# Patient Record
Sex: MALE | HISPANIC OR LATINO | Employment: FULL TIME | URBAN - METROPOLITAN AREA
[De-identification: names, ages, dates, MRNs, and addresses within clinical notes are randomized per-mention and may not be internally consistent; named-entity substitution may affect disease eponyms.]

---

## 2022-03-29 ENCOUNTER — TRANSCRIBE ORDERS (OUTPATIENT)
Dept: ADMINISTRATIVE | Facility: HOSPITAL | Age: 32
End: 2022-03-29

## 2022-03-29 DIAGNOSIS — N50.89 SCROTAL MASS: Primary | ICD-10-CM

## 2022-04-21 ENCOUNTER — HOSPITAL ENCOUNTER (OUTPATIENT)
Dept: ULTRASOUND IMAGING | Facility: HOSPITAL | Age: 32
Discharge: HOME | End: 2022-04-21
Admitting: PHYSICIAN ASSISTANT

## 2022-04-21 DIAGNOSIS — N50.89 SCROTAL MASS: ICD-10-CM

## 2022-04-21 PROCEDURE — 76870 US EXAM SCROTUM: CPT

## 2022-05-03 ENCOUNTER — OFFICE VISIT (OUTPATIENT)
Dept: UROLOGY | Facility: CLINIC | Age: 32
End: 2022-05-03

## 2022-05-03 VITALS
OXYGEN SATURATION: 96 % | WEIGHT: 284 LBS | HEART RATE: 106 BPM | DIASTOLIC BLOOD PRESSURE: 78 MMHG | SYSTOLIC BLOOD PRESSURE: 146 MMHG | BODY MASS INDEX: 42.06 KG/M2 | HEIGHT: 69 IN

## 2022-05-03 DIAGNOSIS — N50.811 PAIN IN RIGHT TESTICLE: Primary | ICD-10-CM

## 2022-05-03 PROCEDURE — 99203 OFFICE O/P NEW LOW 30 MIN: CPT | Performed by: STUDENT IN AN ORGANIZED HEALTH CARE EDUCATION/TRAINING PROGRAM

## 2022-05-03 RX ORDER — GUAIFENESIN 600 MG/1
1200 TABLET, EXTENDED RELEASE ORAL 2 TIMES DAILY
COMMUNITY

## 2022-05-03 NOTE — PROGRESS NOTES
Office Visit New Urology      Patient Name: Roshan Starks  : 1990   MRN: [id number]     Chief Complaint:    Chief Complaint   Patient presents with   • New Patient   • Testicular Mass       Referring Provider: No ref. provider found    History of Present Illness: Roshan Starks is a 31 y.o. male who presents to Urology today for evaluation of right testicular pain/lump.  Patient states 1 month ago he developed a slight testicular pain at the superior pole of his right testicle.  He is unsure if the pain was related to his epididymis.  He went to his primary care provider and told her he had a lump on his testicle.  His primary care provider, DARIO Love noted a 1 cm RIGHT testicular mass.  He underwent a scrotal ultrasound at [facility] which demonstrates completely normal testicles bilaterally.  No evidence of abnormalities within the epididymis or testicles, no obvious testicular lesions or masses.  Images personally reviewed.    Patient states his pain only lasted for a few days and then resolved.  He denies fevers or chills.  Denies any urinary difficulties.  Physical examination today is completely benign.  He has no testicular masses and normal-appearing bilateral epididymis with no tenderness to palpation.  He has no other significant past medical history.    Subjective      Review of System: Review of Systems   Constitutional: Negative for chills, fatigue, fever and unexpected weight change.   HENT: Negative for sore throat.    Eyes: Negative for visual disturbance.   Respiratory: Negative for cough, chest tightness and shortness of breath.    Cardiovascular: Negative for chest pain and leg swelling.   Gastrointestinal: Negative for blood in stool, constipation, diarrhea, nausea, rectal pain and vomiting.   Genitourinary: Negative for decreased urine volume, difficulty urinating, dysuria, enuresis, flank pain, frequency, genital sores, hematuria and urgency.  "  Musculoskeletal: Negative for back pain and joint swelling.   Skin: Negative for rash and wound.   Neurological: Negative for seizures, speech difficulty, weakness and headaches.   Psychiatric/Behavioral: Negative for confusion, sleep disturbance and suicidal ideas. The patient is not nervous/anxious.       I have reviewed the ROS documented by my clinical staff, I have updated appropriately and I agree. Mychal Lenz MD    Past Medical History: History reviewed. No pertinent past medical history.    Past Surgical History: History reviewed. No pertinent surgical history.    Family History: History reviewed. No pertinent family history.    Social History:   Social History     Socioeconomic History   • Marital status: Single   Tobacco Use   • Smoking status: Never Smoker   Vaping Use   • Vaping Use: Never used   Substance and Sexual Activity   • Alcohol use: Not Currently   • Drug use: Never   • Sexual activity: Not Currently       Medications:     Current Outpatient Medications:   •  guaiFENesin (MUCINEX) 600 MG 12 hr tablet, Take 1,200 mg by mouth 2 (Two) Times a Day., Disp: , Rfl:     Allergies:   No Known Allergies       Objective     Physical Exam:   Vital Signs:   Vitals:    05/03/22 0735   BP: 146/78   Pulse: 106   SpO2: 96%   Weight: 129 kg (284 lb)   Height: 175.3 cm (69\")     Body mass index is 41.94 kg/m².     Physical Exam  Vitals and nursing note reviewed.   Constitutional:       Appearance: Normal appearance.   HENT:      Head: Normocephalic and atraumatic.      Mouth/Throat:      Mouth: Mucous membranes are moist.      Pharynx: Oropharynx is clear.   Eyes:      Extraocular Movements: Extraocular movements intact.      Conjunctiva/sclera: Conjunctivae normal.   Cardiovascular:      Rate and Rhythm: Normal rate and regular rhythm.   Pulmonary:      Effort: Pulmonary effort is normal. No respiratory distress.   Abdominal:      Palpations: Abdomen is soft.      Tenderness: There is no abdominal " tenderness. There is no right CVA tenderness or left CVA tenderness.   Genitourinary:     Comments: Circumcised phallus, orthotopic meatus, bilaterally descended testicles without masses, or lesions.     Musculoskeletal:         General: Normal range of motion.      Cervical back: Normal range of motion.   Skin:     General: Skin is warm and dry.   Neurological:      General: No focal deficit present.      Mental Status: He is alert and oriented to person, place, and time.   Psychiatric:         Mood and Affect: Mood normal.         Behavior: Behavior normal.         Labs:   Brief Urine Lab Results     None               No results found for: GLUCOSE, CALCIUM, NA, K, CO2, CL, BUN, CREATININE, EGFRIFAFRI, EGFRIFNONA, BCR, ANIONGAP    No results found for: WBC, HGB, HCT, MCV, PLT    Images:   US Scrotum & Testicles    Result Date: 4/22/2022  Normal appearance of the testicles. No findings to explain reported left scrotal palpable abnormality.  Nonspecific thickening of the scrotum and correlate clinically to exclude soft tissue infection.  This report was finalized on 4/22/2022 9:19 AM by Joaquin Lenz MD.        Measures:   Tobacco:   Roshan Starks  reports that he has never smoked. He does not have any smokeless tobacco history on file.       Assessment / Plan      Assessment/Plan:   Roshan Starks is a 31 y.o. male who presented today for subjective right testicular mass and pain which began about a month ago but resolved over a few days.  There was reported findings of 1 cm right testicular mass based on notes.  His ultrasound and physical exam completely rule this out.  He has no concerning findings on exam or scrotal ultrasound.  He may have had some tenderness around his right epididymis, the patient was unaware of what the epididymis was or where this was in location compared to his right testicle.  We went over some testicular anatomy today, explained what the epididymis is and what he was  feeling.  If he develops any significant pain, tenderness or swelling he can be referred back to me.  Otherwise he was reassured that there is nothing abnormal on his examination.    Diagnoses and all orders for this visit:    1. Pain in right testicle (Primary)     -Resolved naturally over a few days  -Likely epididymal in origin  -Normal scrotal ultrasound with no lesions or epididymitis, no orchitis noted  -Patient is reassured he does not have testicular cancer      Follow Up:   Return if symptoms worsen or fail to improve.    I spent approximately 20 minutes providing clinical care for this patient; including review of patient's chart and provider documentation, face to face time spent with patient in examination room (obtaining history, performing physical exam, discussing diagnosis and management options), placing orders, and completing patient documentation.     Mychal Lenz MD  [facility] [place]

## 2023-08-23 ENCOUNTER — TELEPHONE (OUTPATIENT)
Dept: UROLOGY | Facility: CLINIC | Age: 33
End: 2023-08-23

## 2023-08-23 NOTE — TELEPHONE ENCOUNTER
Provider: DR BHATTI  Caller: REGINA SANTAMARIA  Relationship to Patient: SELF  Reason for Call: PT IS EXPRIENCING LEFT TESTICULAR PAIN AGAIN.  SCHEDULED FU APPT 8/28/23.  PLEASE REVIEW AND CALL PT IF ANY IMAGING IS REQUIRED PRIOR TO THE APPT  When was the patient last seen: 7/20/23

## 2023-08-28 ENCOUNTER — OFFICE VISIT (OUTPATIENT)
Dept: UROLOGY | Facility: CLINIC | Age: 33
End: 2023-08-28
Payer: COMMERCIAL

## 2023-08-28 VITALS — HEART RATE: 79 BPM | HEIGHT: 69 IN | WEIGHT: 284 LBS | BODY MASS INDEX: 42.06 KG/M2 | OXYGEN SATURATION: 99 %

## 2023-08-28 DIAGNOSIS — N45.1 EPIDIDYMITIS: ICD-10-CM

## 2023-08-28 DIAGNOSIS — N50.811 PAIN IN RIGHT TESTICLE: Primary | ICD-10-CM

## 2023-08-28 RX ORDER — DOXYCYCLINE HYCLATE 100 MG/1
100 CAPSULE ORAL 2 TIMES DAILY
Qty: 28 CAPSULE | Refills: 0 | Status: SHIPPED | OUTPATIENT
Start: 2023-08-28 | End: 2023-09-11

## 2023-08-28 RX ORDER — CELECOXIB 100 MG/1
100 CAPSULE ORAL 2 TIMES DAILY
Qty: 30 CAPSULE | Refills: 0 | Status: SHIPPED | OUTPATIENT
Start: 2023-08-28

## 2023-08-28 NOTE — PROGRESS NOTES
Follow Up Office Visit      Patient Name: Roshan Starks  : 1990   MRN: [id number]     Chief Complaint:    Chief Complaint   Patient presents with    Testicular pain, right       Referring Provider: No ref. provider found    History of Present Illness: Roshan Starks is a 32 y.o. male who presents today for follow up.  I have seen this patient twice for testicular or scrotal complaints.  I saw him last on 2023 at which point he was complaining of a left-sided scrotal skin bump, which was not obvious on examination.  He reports some ongoing right-sided testicular discomfort.  He underwent a scrotal ultrasound in May 2022 for similar complaints and this was entirely benign.    Today the patient reports a nagging pulling sensation in the right scrotum and a intermittent discomfort in the right lower pole of the testicle.  Physical examination is benign.  He has mild tenderness in the right epididymis.  He is not sexually active.  He denies urinary complaints.    Subjective      Review of System: Review of Systems   Genitourinary:  Positive for testicular pain. Negative for decreased urine volume, difficulty urinating, dysuria, enuresis, flank pain, frequency, hematuria and urgency.    I have reviewed the ROS documented by my clinical staff, I have updated appropriately and I agree. Mychal Lenz MD    I have reviewed and the following portions of the patient's history were updated as appropriate: past family history, past medical history, past social history, past surgical history and problem list.    Medications:     Current Outpatient Medications:     celecoxib (CeleBREX) 100 MG capsule, Take 1 capsule by mouth 2 (Two) Times a Day., Disp: 30 capsule, Rfl: 0    doxycycline (VIBRAMYCIN) 100 MG capsule, Take 1 capsule by mouth 2 (Two) Times a Day for 14 days., Disp: 28 capsule, Rfl: 0    Allergies:   No Known Allergies    IPSS Questionnaire (AUA-7):  Over the past month.    1)   Incomplete Emptying:       How often have you had a sensation of not emptying you had the sensation of not emptying your bladder completely after you finished urinating?  1 - Less than 1 time in 5   2)  Frequency:       How often have you had the urinate again less than two hours after you finished urinating?  1 - Less than 1 time in 5   3)  Intermittency:       How often have you found you stopped and started again several times when you urinated?   1 - Less than 1 time in 5   4) Urgency:      How often have you found it difficult to postpone urination?  1 - Less than 1 time in 5   5) Weak Stream:      How often have you had a weak urinary stream?  1 - Less than 1 time in 5   6) Straining:       How often have you had to push or strain to begin urination?  1 - Less than 1 time in 5   7) Nocturia:      How many times did you most typically get up to urinate from the time you went to bed at night until the time you got up in the morning?  1 - 1 time   Total Score:  7   The International Prostate Symptom Score (IPSS) is used to screen, diagnose, track symptoms of benign prostatic hyperplasia (BPH).   0-7 (Mild Symptoms) 8-19 (Moderate) 20-35 (Severe)   Quality of Life (QoL):  If you were to spend the rest of your life with your urinary condition just the way it is now, how would you feel about that? 1-Pleased   Urine Leakage (Incontinence) 1-Mild (A few drops a day, no pad use)     Sexual Health Inventory for Men (SOFÍA)   Over the past 6 months:     1. How do you rate your confidence that you could get and keep an erection?  4 - High    2. When you had erections with sexual  stimulation, how often were your erections hard enough for penetration (entering your partner)?  5 - Almost always or always    3. During sexual intercourse, how often were you able to maintain your erection after you had penetrated (entered) your partner?  4 - Most times ( much more than, half the time)   4. During sexual intercourse, how  "difficult was it to maintain your erection to completion of intercourse?  4 - Sightly difficult    5. When you attempted sexual intercourse, how often was it satisfactory for you?  4 - Most times ( much more than, half the time)    Total Score: 21   The Sexual Health Inventory for Men further classifies ED severity with the following breakpoints:   1-7 (Severe ED) 8-11 (Moderate ED) 12-16 (Mild to Moderate ED) 17-21 (Mild ED)          Objective     Physical Exam:   Vital Signs:   Vitals:    08/28/23 1515   Pulse: 79   SpO2: 99%   Weight: 129 kg (284 lb)   Height: 175.3 cm (69.02\")   PainSc: 0-No pain     Body mass index is 41.92 kg/mý.     Physical Exam  Vitals and nursing note reviewed.   Constitutional:       Appearance: Normal appearance. He is obese.   HENT:      Head: Normocephalic and atraumatic.      Mouth/Throat:      Mouth: Mucous membranes are moist.      Pharynx: Oropharynx is clear.   Eyes:      Extraocular Movements: Extraocular movements intact.      Conjunctiva/sclera: Conjunctivae normal.   Cardiovascular:      Rate and Rhythm: Normal rate and regular rhythm.   Pulmonary:      Effort: Pulmonary effort is normal. No respiratory distress.   Abdominal:      Palpations: Abdomen is soft.      Tenderness: There is no abdominal tenderness. There is no right CVA tenderness or left CVA tenderness.   Genitourinary:     Comments: Uncircumcised phallus, orthotopic meatus, bilaterally descended testicles without masses, or lesions.  Mild tenderness to the right epididymis.  No induration or masses bilaterally.  Musculoskeletal:         General: Normal range of motion.      Cervical back: Normal range of motion.   Skin:     General: Skin is warm and dry.   Neurological:      General: No focal deficit present.      Mental Status: He is alert and oriented to person, place, and time.   Psychiatric:         Mood and Affect: Mood normal.         Behavior: Behavior normal.       Labs:   Brief Urine Lab Results       " None                 No results found for: GLUCOSE, CALCIUM, NA, K, CO2, CL, BUN, CREATININE, EGFRIFAFRI, EGFRIFNONA, BCR, ANIONGAP    No results found for: WBC, HGB, HCT, MCV, PLT    Images:   No Images in the past 120 days found..    Measures:   Tobacco:   Roshan Starks  reports that he has quit smoking. His smoking use included cigarettes. He has been exposed to tobacco smoke. He has never used smokeless tobacco.    Assessment / Plan      Assessment/Plan:   32 y.o. male who presented today for follow up of chronic right-sided testicular pain.  Comes and goes.  Mild right epididymal tenderness.  Normal scrotal ultrasound last year.  No findings on examination other than mild tenderness to deep palpation of the right epididymis.  Reasonable to go ahead and treat with empiric course of doxycycline plus celecoxib, antibiotic plus anti-inflammatory.  He will call me if persistent symptoms.  If refractory discomfort, I will likely recommend a CT to rule out inguinal hernia (not obviously palpable on exam however BMI is 41), or referral to pain specialist for spermatic cord block.    Diagnoses and all orders for this visit:    1. Pain in right testicle (Primary)  -     doxycycline (VIBRAMYCIN) 100 MG capsule; Take 1 capsule by mouth 2 (Two) Times a Day for 14 days.  Dispense: 28 capsule; Refill: 0  -     celecoxib (CeleBREX) 100 MG capsule; Take 1 capsule by mouth 2 (Two) Times a Day.  Dispense: 30 capsule; Refill: 0    2. Epididymitis  -     doxycycline (VIBRAMYCIN) 100 MG capsule; Take 1 capsule by mouth 2 (Two) Times a Day for 14 days.  Dispense: 28 capsule; Refill: 0  -     celecoxib (CeleBREX) 100 MG capsule; Take 1 capsule by mouth 2 (Two) Times a Day.  Dispense: 30 capsule; Refill: 0           Follow Up:   Return if symptoms worsen or fail to improve.    I spent approximately 30 minutes providing clinical care for this patient; including review of patient's chart and provider documentation, face to face  time spent with patient in examination room (obtaining history, performing physical exam, discussing diagnosis and management options), placing orders, and completing patient documentation.     Mychal Lenz MD  [facility] [place]

## 2023-09-01 ENCOUNTER — PRIOR AUTHORIZATION (OUTPATIENT)
Dept: UROLOGY | Facility: CLINIC | Age: 33
End: 2023-09-01
Payer: COMMERCIAL

## 2024-04-17 ENCOUNTER — TELEPHONE (OUTPATIENT)
Dept: UROLOGY | Facility: CLINIC | Age: 34
End: 2024-04-17

## 2024-04-17 NOTE — TELEPHONE ENCOUNTER
Hub staff attempted to follow warm transfer process and was unsuccessful     Caller: Roshan Starks    Relationship to patient: Self    Best call back number: [phone]    Patient is needing: PT CALLED TODAY REGARDING LEFT TESTICLE PAIN.  HE WAS SEEN BY DR BHATTI PREVIOUSLY FOR PAIN ON THE RIGHT SIDE. PLEASE CALL PT TO ADVISE. THANK YOU

## 2024-04-22 ENCOUNTER — OFFICE VISIT (OUTPATIENT)
Age: 34
End: 2024-04-22
Payer: COMMERCIAL

## 2024-04-22 VITALS
OXYGEN SATURATION: 99 % | SYSTOLIC BLOOD PRESSURE: 145 MMHG | HEIGHT: 69 IN | BODY MASS INDEX: 43.99 KG/M2 | HEART RATE: 82 BPM | DIASTOLIC BLOOD PRESSURE: 93 MMHG | WEIGHT: 297 LBS

## 2024-04-22 DIAGNOSIS — N50.812 TESTICULAR PAIN, LEFT: Primary | ICD-10-CM

## 2024-04-22 PROCEDURE — 99213 OFFICE O/P EST LOW 20 MIN: CPT | Performed by: STUDENT IN AN ORGANIZED HEALTH CARE EDUCATION/TRAINING PROGRAM

## 2024-04-22 RX ORDER — IBUPROFEN 200 MG
200 TABLET ORAL AS NEEDED
COMMUNITY

## 2024-04-22 RX ORDER — CETIRIZINE HYDROCHLORIDE 10 MG/1
10 TABLET ORAL DAILY
COMMUNITY

## 2024-04-22 NOTE — PROGRESS NOTES
Follow Up Office Visit      Patient Name: Roshan Starks  : 1990   MRN: [id number]     Chief Complaint:    Chief Complaint   Patient presents with    Pain in right testicle    left testicle pain       Referring Provider: No ref. provider found    History of Present Illness: Roshan Starks is a 33 y.o. male who presents today for follow up of testicular pain.  Vague chronic testicular discomfort.  Last seen in August.  Treated with meloxicam and doxycycline.  Pain seemed to improve after antibiotic.  Requested follow-up as last week stated he noticed onset of left testicular discomfort.  He states his pain is now 1 out of 10 but was worse when he lay down.  States he lifts a lot of heavy objects at work.      Subjective      Review of System: Review of Systems   Genitourinary: Negative.       I have reviewed the ROS documented by my clinical staff, I have updated appropriately and I agree. Mychal Lenz MD    I have reviewed and the following portions of the patient's history were updated as appropriate: past family history, past medical history, past social history, past surgical history and problem list.    Medications:     Current Outpatient Medications:     celecoxib (CeleBREX) 100 MG capsule, Take 1 capsule by mouth 2 (Two) Times a Day., Disp: 30 capsule, Rfl: 0    cetirizine (zyrTEC) 10 MG tablet, Take 1 tablet by mouth Daily., Disp: , Rfl:     ibuprofen (ADVIL,MOTRIN) 200 MG tablet, Take 1 tablet by mouth As Needed for Mild Pain. Takes 2 for pain, Disp: , Rfl:     Allergies:   No Known Allergies    IPSS Questionnaire (AUA-7):  Over the past month…    1)  Incomplete Emptying:       How often have you had a sensation of not emptying you had the sensation of not emptying your bladder completely after you finished urinating?  1 - Less than 1 time in 5   2)  Frequency:       How often have you had the urinate again less than two hours after you finished urinating?  1 - Less than 1  "time in 5   3)  Intermittency:       How often have you found you stopped and started again several times when you urinated?   0 - Not at all   4) Urgency:      How often have you found it difficult to postpone urination?  0 - Not at all   5) Weak Stream:      How often have you had a weak urinary stream?  1 - Less than 1 time in 5   6) Straining:       How often have you had to push or strain to begin urination?  1 - Less than 1 time in 5   7) Nocturia:      How many times did you most typically get up to urinate from the time you went to bed at night until the time you got up in the morning?  1 - 1 time   Total Score:  5   The International Prostate Symptom Score (IPSS) is used to screen, diagnose, track symptoms of benign prostatic hyperplasia (BPH).   0-7 (Mild Symptoms) 8-19 (Moderate) 20-35 (Severe)   Quality of Life (QoL):  If you were to spend the rest of your life with your urinary condition just the way it is now, how would you feel about that? 1-Pleased   Urine Leakage (Incontinence) 0-No Leakage         Objective     Physical Exam:   Vital Signs:   Vitals:    04/22/24 0953   BP: 145/93   Pulse: 82   SpO2: 99%   Weight: 135 kg (297 lb)   Height: 175.3 cm (69\")   PainSc:   1   PainLoc: Scrotum     Body mass index is 43.86 kg/m².     Physical Exam  Constitutional:       Appearance: Normal appearance.   HENT:      Head: Normocephalic and atraumatic.      Nose: Nose normal.   Eyes:      Extraocular Movements: Extraocular movements intact.      Conjunctiva/sclera: Conjunctivae normal.      Pupils: Pupils are equal, round, and reactive to light.   Genitourinary:     Comments: Circumcised phallus but buried secondary to obesity.  Normal bilateral testicles with no discomfort to palpation  Musculoskeletal:         General: Normal range of motion.      Cervical back: Normal range of motion and neck supple.   Skin:     General: Skin is warm and dry.      Findings: No lesion or rash.   Neurological:      General: No " "focal deficit present.      Mental Status: He is alert and oriented to person, place, and time. Mental status is at baseline.   Psychiatric:         Mood and Affect: Mood normal.         Behavior: Behavior normal.         Labs:   Brief Urine Lab Results       None                 No results found for: \"GLUCOSE\", \"CALCIUM\", \"NA\", \"K\", \"CO2\", \"CL\", \"BUN\", \"CREATININE\", \"EGFRIFAFRI\", \"EGFRIFNONA\", \"BCR\", \"ANIONGAP\"    No results found for: \"WBC\", \"HGB\", \"HCT\", \"MCV\", \"PLT\"    Images:   No Images in the past 120 days found..    Measures:   Tobacco:   Roshan Starks  reports that he has quit smoking. His smoking use included cigarettes. He has been exposed to tobacco smoke. He has never used smokeless tobacco.      Assessment / Plan      Assessment/Plan:   33 y.o. male who presented today for follow up of chronic bilateral testicular discomfort.  Negative previous good ultrasound.  Negative exam findings today.  Low suspicion for hernia based on examination, we discussed option for CT scan but he is worried about out-of-pocket cost based on his insurance, therefore we will simply avoid anything for now, his pain has largely resolved.  We will send a guidance urine culture to rule out a smoldering UTI or epididymitis and call him with result    Diagnoses and all orders for this visit:    1. Testicular pain, left (Primary)           Follow Up:   Return if symptoms worsen or fail to improve.    I spent approximately 20 minutes providing clinical care for this patient; including review of patient's chart and provider documentation, face to face time spent with patient in examination room (obtaining history, performing physical exam, discussing diagnosis and management options), placing orders, and completing patient documentation.     Mychal Lenz MD  [facility] [place]   "

## 2024-04-26 NOTE — PROGRESS NOTES
Please let patient know Guidance culture came back with NO bacterial organisms, good news.     Mychal Lenz MD

## 2024-08-05 ENCOUNTER — PRIOR AUTHORIZATION (OUTPATIENT)
Age: 34
End: 2024-08-05
Payer: COMMERCIAL